# Patient Record
Sex: FEMALE | Race: WHITE | NOT HISPANIC OR LATINO | Employment: FULL TIME | URBAN - METROPOLITAN AREA
[De-identification: names, ages, dates, MRNs, and addresses within clinical notes are randomized per-mention and may not be internally consistent; named-entity substitution may affect disease eponyms.]

---

## 2017-12-18 ENCOUNTER — OFFICE VISIT (OUTPATIENT)
Dept: DERMATOLOGY | Facility: CLINIC | Age: 22
End: 2017-12-18
Payer: COMMERCIAL

## 2017-12-18 DIAGNOSIS — Z12.83 SKIN CANCER SCREENING: Primary | ICD-10-CM

## 2017-12-18 DIAGNOSIS — L82.1 SEBORRHEIC KERATOSIS: ICD-10-CM

## 2017-12-18 DIAGNOSIS — D22.9 MULTIPLE BENIGN NEVI: ICD-10-CM

## 2017-12-18 RX ORDER — NORETHINDRONE ACETATE AND ETHINYL ESTRADIOL 1MG-20(21)
KIT ORAL
Refills: 3 | COMMUNITY
Start: 2017-01-05 | End: 2024-06-19

## 2017-12-18 NOTE — LETTER
Date:December 19, 2017      Patient was self referred, no letter generated. Do not send.        [facility] Health Information

## 2017-12-18 NOTE — PROGRESS NOTES
"[facility] Dermatology Note    Dermatology Problem List:  1. Clinically benign nevi, SK on the right lateral abdomen  2. Skin cancer screening 12/18/17    CC:   Chief Complaint   Patient presents with     Skin Check     Skin check, Farhan states \" The moles on my stomach are being weird.\"     Date of Service: Dec 18, 2017    History of Present Illness:  Ms. Farhan Fowler is a 22 year old female who presents for a skin check as a new patient. Today the patient reports that she has moles of concern on her stomach and upper back. She states that the ones on her stomach started to change shape from [place] to oval so she was nervous. She also reports that one of the lesions scabbed up and then fell off. She notes that she has had the moles on her stomach since she can remember. She denies fevers, chills, chronic cough, or unexplained weightloss. She states that this was a very stressful semester for her. The patient reports no other lesions of concern at this time.    Otherwise, the patient reports no painful, bleeding, nonhealing, or pruritic lesions, and denies new or changing moles.    Past Medical History:   Patient Active Problem List   Diagnosis     NO ACTIVE PROBLEMS     CARDIOVASCULAR SCREENING; LDL GOAL LESS THAN 160     Past Medical History:   Diagnosis Date     NO ACTIVE PROBLEMS      No past surgical history on file.     Social History:  Patient has had minimal sun exposure, she burns very easily.  Used tanning beds once before her edis year prom.  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 Studied abroad in [place].  Moving to [place] after graduation, aspires to work in [employer]. Does not want to work in corporate.    Family History:  No known family history of skin cancer.    Medications:  Current Outpatient Prescriptions   Medication Sig Dispense Refill     medroxyPROGESTERone (DEPO-PROVERA) 150 MG/ML injection Inject 1 mL (150 mg) into the muscle every 3 " months (Patient not taking: Reported on 12/18/2017) 1 mL 4     Allergies:  Allergies   Allergen Reactions     Penicillins      unknown     Review of Systems:  - Skin: As above in HPI. No additional skin concerns.    Physical exam:  Vitals: There were no vitals taken for this visit.  GEN: This is a well developed, well-nourished female in no acute distress, in a pleasant mood.      SKIN: Full skin, which includes the head/face, both arms, chest, back, abdomen,both legs, genitalia and/or groin buttocks, digits and/or nails, was examined.  - Regular brown pigmented macules and papules are identified on the trunk, extremities, abdomen.   - Stuck on tan to brown papule on the right lateral abdomen  - No other lesions of concern on areas examined.     Impression/Plan:  1. Clinically benign nevi - trunk, extremities, abdomen  - No further intervention required. Patient to report changes.   - Sun precaution was advised including the use of sun screens of SPF 30 or higher, sun protective clothing, and avoidance of tanning beds.  - ABCDEs of melanoma were discussed and self skin checks were advised.     2. Seborrheic keratosis - right lateral abdomen  - No further intervention required. Patient to report changes.     Follow-up in 1 year, earlier for new or changing lesions.    Staff Involved:  Staff Only    Scribe Disclosure:   I, Leland Ceballos, am serving as a scribe to document services personally performed by Analilia Esquivel PA-C, based on data collection and the provider's statements to me.    Provider Disclosure:   The documentation recorded by the scribe accurately reflects the services I personally performed and the decisions made by me.    All risks, benefits and alternatives were discussed with patient.  Patient is in agreement and understands the assessment and plan.  All questions were answered.  Sun Screen Education was given.   Return to Clinic annually or sooner as needed.   Analilia Esquivel PA-C   [place]  [facility]

## 2017-12-18 NOTE — LETTER
"12/18/2017       RE: Farhan Fowler  [address] [address] [place] [id number]     Dear Colleague,    Thank you for referring your patient, Farhan Fowler, to the [facility]ERMATOLOG[facility] at [facility]. Please see a copy of my visit note below.    [facility] Dermatology Note    Dermatology Problem List:  1. Clinically benign nevi, SK on the right lateral abdomen  2. Skin cancer screening 12/18/17    CC:   Chief Complaint   Patient presents with     Skin Check     Skin check, Farhan states \" The moles on my stomach are being weird.\"     Date of Service: Dec 18, 2017    History of Present Illness:  Ms. Farhan Fowler is a 22 year old female who presents for a skin check as a new patient. Today the patient reports that she has moles of concern on her stomach and upper back. She states that the ones on her stomach started to change shape from [place] to oval so she was nervous. She also reports that one of the lesions scabbed up and then fell off. She notes that she has had the moles on her stomach since she can remember. She denies fevers, chills, chronic cough, or unexplained weightloss. She states that this was a very stressful semester for her. The patient reports no other lesions of concern at this time.    Otherwise, the patient reports no painful, bleeding, nonhealing, or pruritic lesions, and denies new or changing moles.    Past Medical History:   Patient Active Problem List   Diagnosis     NO ACTIVE PROBLEMS     CARDIOVASCULAR SCREENING; LDL GOAL LESS THAN 160     Past Medical History:   Diagnosis Date     NO ACTIVE PROBLEMS      No past surgical history on file.     Social History:  Patient has had minimal sun exposure, she burns very easily.  Used tanning beds once before her edis year prom.  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 Studied abroad in [place].  Moving to [place] after graduation, aspires to work in " animation. Does not want to work in [employer].    Family History:  No known family history of skin cancer.    Medications:  Current Outpatient Prescriptions   Medication Sig Dispense Refill     medroxyPROGESTERone (DEPO-PROVERA) 150 MG/ML injection Inject 1 mL (150 mg) into the muscle every 3 months (Patient not taking: Reported on 12/18/2017) 1 mL 4     Allergies:  Allergies   Allergen Reactions     Penicillins      unknown     Review of Systems:  - Skin: As above in HPI. No additional skin concerns.    Physical exam:  Vitals: There were no vitals taken for this visit.  GEN: This is a well developed, well-nourished female in no acute distress, in a pleasant mood.      SKIN: Full skin, which includes the head/face, both arms, chest, back, abdomen,both legs, genitalia and/or groin buttocks, digits and/or nails, was examined.  - Regular brown pigmented macules and papules are identified on the trunk, extremities, abdomen.   - Stuck on tan to brown papule on the right lateral abdomen  - No other lesions of concern on areas examined.     Impression/Plan:  1. Clinically benign nevi - trunk, extremities, abdomen  - No further intervention required. Patient to report changes.   - Sun precaution was advised including the use of sun screens of SPF 30 or higher, sun protective clothing, and avoidance of tanning beds.  - ABCDEs of melanoma were discussed and self skin checks were advised.     2. Seborrheic keratosis - right lateral abdomen  - No further intervention required. Patient to report changes.     Follow-up in 1 year, earlier for new or changing lesions.    Staff Involved:  Staff Only    Scribe Disclosure:   I, Leland Ceballos, am serving as a scribe to document services personally performed by Analilia Esquivel PA-C, based on data collection and the provider's statements to me.    Provider Disclosure:   The documentation recorded by the scribe accurately reflects the services I personally performed and the decisions  made by me.    All risks, benefits and alternatives were discussed with patient.  Patient is in agreement and understands the assessment and plan.  All questions were answered.  Sun Screen Education was given.   Return to Clinic annually or sooner as needed.   Analilia Esquivel PA-C   [facility]     Again, thank you for allowing me to participate in the care of your patient.      Sincerely,    Analilia Esquivel PA-C

## 2017-12-18 NOTE — NURSING NOTE
"Dermatology Rooming Note    Farhan Fowler's goals for this visit include:   Chief Complaint   Patient presents with     Skin Check     Skin check, Farhan states \" The moles on my stomach are being weird.\"     Helen Pacheco LPN  "

## 2017-12-18 NOTE — MR AVS SNAPSHOT
After Visit Summary   2017    Farhan Fowler    MRN: [id number]           Patient Information     Date Of Birth          1995        Visit Information        Provider Department      2017 5:30 PM Analilia Esquivel PA-C [facility] Dermatology        Today's Diagnoses     Skin cancer screening    -  1    Multiple benign nevi        Seborrheic keratosis           Follow-ups after your visit        Follow-up notes from your care team     Return in about 1 year (around 2018).      Who to contact     Please call your clinic at [phone] to:    Ask questions about your health    Make or cancel appointments    Discuss your medicines    Learn about your test results    Speak to your doctor   If you have compliments or concerns about an experience at your clinic, or if you wish to file a complaint, please contact [facility] Patient Relations at [phone] or email us at [email or web address]         Additional Information About Your Visit        MyChart Information     [employer] is an electronic gateway that provides easy, online access to your medical records. With [employer], you can request a clinic appointment, read your test results, renew a prescription or communicate with your care team.     To sign up for [employer] visit the website at [email or web address]   You will be asked to enter the access code listed below, as well as some personal information. Please follow the directions to create your username and password.     Your access code is: [email or web address]  Expires: 3/18/2018  7:06 PM     Your access code will  in 90 days. If you need help or a new code, please contact your [facility] or call [phone] for assistance.        Care EveryWhere ID     This is your Care EveryWhere ID. This could be used by other organizations to access your [facility] medical records  [id number]         Blood Pressure  from Last 3 Encounters:   07/10/15 110/60   11/28/14 100/62   08/28/13 120/82    Weight from Last 3 Encounters:   07/10/15 78.7 kg (173 lb 8 oz) (93 %)*   11/28/14 78 kg (172 lb) (93 %)*   08/28/13 78 kg (172 lb) (94 %)*     * Growth percentiles are based on [facility] 2-20 Years data.              Today, you had the following     No orders found for display         Today's Medication Changes          These changes are accurate as of: 12/18/17  7:06 PM.  If you have any questions, ask your nurse or doctor.               Stop taking these medicines if you haven't already. Please contact your care team if you have questions.     medroxyPROGESTERone 150 MG/ML injection   Commonly known as:  DEPO-PROVERA   Stopped by:  Analilia Esquivel PA-C                    Primary Care Provider    None Specified       No primary provider on file.        Equal Access to Services     MOY MORENO : Amol Mar, venus rainey, suzi wills, kelsey franklin . So [facility] [phone].    ATENCIÓN: Si habla español, tiene a walsh disposición servicios gratuitos de asistencia lingüística. Llame al [phone].    We comply with applicable federal civil rights laws and [place] laws. We do not discriminate on the basis of race, color, national origin, age, disability, sex, sexual orientation, or gender identity.            Thank you!     Thank you for choosing [facility] DERMATOLOGY  for your care. Our goal is always to provide you with excellent care. Hearing back from our patients is one way we can continue to improve our services. Please take a few minutes to complete the written survey that you may receive in the mail after your visit with us. Thank you!             Your Updated Medication List - Protect others around you: Learn how to safely use, store and throw away your medicines at www.disposemymeds.org.          This list is accurate as of: 12/18/17  7:06 PM.  Always use your most  recent med list.                   Brand Name Dispense Instructions for use Diagnosis    BLISOVI FE 1/20 1-20 MG-MCG per tablet   Generic drug:  norethindrone-ethinyl estradiol      TAKE 1 TABLET BY MOUTH DAILY.

## 2020-07-24 ENCOUNTER — TELEPHONE (OUTPATIENT)
Dept: SCHEDULING | Age: 25
End: 2020-07-24

## 2020-07-25 ENCOUNTER — LAB SERVICES (OUTPATIENT)
Dept: LAB | Age: 25
End: 2020-07-25

## 2020-07-25 DIAGNOSIS — Z20.822 SUSPECTED COVID-19 VIRUS INFECTION: Primary | ICD-10-CM

## 2020-07-25 PROCEDURE — 87635 SARS-COV-2 COVID-19 AMP PRB: CPT | Performed by: FAMILY MEDICINE

## 2020-07-31 LAB
SARS-COV-2 RNA RESP QL NAA+PROBE: NOT DETECTED
SPECIMEN SOURCE: NORMAL

## 2023-05-12 ENCOUNTER — TRANSFERRED RECORDS (OUTPATIENT)
Dept: HEALTH INFORMATION MANAGEMENT | Facility: CLINIC | Age: 28
End: 2023-05-12

## 2023-05-17 ENCOUNTER — TRANSFERRED RECORDS (OUTPATIENT)
Dept: HEALTH INFORMATION MANAGEMENT | Facility: CLINIC | Age: 28
End: 2023-05-17

## 2023-10-02 ENCOUNTER — TRANSFERRED RECORDS (OUTPATIENT)
Dept: OBGYN | Facility: CLINIC | Age: 28
End: 2023-10-02

## 2024-06-14 ENCOUNTER — TELEPHONE (OUTPATIENT)
Dept: OBGYN | Facility: CLINIC | Age: 29
End: 2024-06-14
Payer: COMMERCIAL

## 2024-06-14 NOTE — TELEPHONE ENCOUNTER
[facility] Call Center    Phone Message    May a detailed message be left on voicemail: yes     Reason for Call: Other: Pt was calling in regards to requesting records for an upcoming appointment with NAYE Dunn. Pt states that she requested medical records to be faxed over. The person the patient spoke to from there, was told to reach out to provider for upcoming appointment to get an urgent request of records sent over. Records would come from [facility], in [place]. Their fax is [phone]. Please call pt with any questions or concerns.      Action Taken: Other: OBGYN    Travel Screening: Not Applicable     Date of Service:

## 2024-06-17 NOTE — TELEPHONE ENCOUNTER
[facility]  Reach out to HIM, number provided.  Carlos Eduardo Briseno RN on 6/17/2024 at 10:40 AM

## 2024-06-19 ENCOUNTER — OFFICE VISIT (OUTPATIENT)
Dept: OBGYN | Facility: CLINIC | Age: 29
End: 2024-06-19
Payer: COMMERCIAL

## 2024-06-19 VITALS — DIASTOLIC BLOOD PRESSURE: 68 MMHG | SYSTOLIC BLOOD PRESSURE: 114 MMHG | WEIGHT: 231 LBS | BODY MASS INDEX: 36.73 KG/M2

## 2024-06-19 DIAGNOSIS — Z13.29 SCREENING FOR THYROID DISORDER: ICD-10-CM

## 2024-06-19 DIAGNOSIS — N92.6 IRREGULAR MENSES: ICD-10-CM

## 2024-06-19 DIAGNOSIS — E28.2 PCOS (POLYCYSTIC OVARIAN SYNDROME): Primary | ICD-10-CM

## 2024-06-19 DIAGNOSIS — E66.09 CLASS 2 OBESITY DUE TO EXCESS CALORIES WITH BODY MASS INDEX (BMI) OF 36.0 TO 36.9 IN ADULT, UNSPECIFIED WHETHER SERIOUS COMORBIDITY PRESENT: ICD-10-CM

## 2024-06-19 DIAGNOSIS — Z13.228 SCREENING FOR METABOLIC DISORDER: ICD-10-CM

## 2024-06-19 DIAGNOSIS — E66.812 CLASS 2 OBESITY DUE TO EXCESS CALORIES WITH BODY MASS INDEX (BMI) OF 36.0 TO 36.9 IN ADULT, UNSPECIFIED WHETHER SERIOUS COMORBIDITY PRESENT: ICD-10-CM

## 2024-06-19 LAB — HBA1C MFR BLD: 5 % (ref 0–5.6)

## 2024-06-19 PROCEDURE — 99204 OFFICE O/P NEW MOD 45 MIN: CPT | Performed by: NURSE PRACTITIONER

## 2024-06-19 PROCEDURE — 84443 ASSAY THYROID STIM HORMONE: CPT | Performed by: NURSE PRACTITIONER

## 2024-06-19 PROCEDURE — 86800 THYROGLOBULIN ANTIBODY: CPT | Performed by: NURSE PRACTITIONER

## 2024-06-19 PROCEDURE — 84403 ASSAY OF TOTAL TESTOSTERONE: CPT | Performed by: NURSE PRACTITIONER

## 2024-06-19 PROCEDURE — 82626 DEHYDROEPIANDROSTERONE: CPT | Mod: 90 | Performed by: NURSE PRACTITIONER

## 2024-06-19 PROCEDURE — 86376 MICROSOMAL ANTIBODY EACH: CPT | Performed by: NURSE PRACTITIONER

## 2024-06-19 PROCEDURE — 84270 ASSAY OF SEX HORMONE GLOBUL: CPT | Performed by: NURSE PRACTITIONER

## 2024-06-19 PROCEDURE — 99000 SPECIMEN HANDLING OFFICE-LAB: CPT | Performed by: NURSE PRACTITIONER

## 2024-06-19 PROCEDURE — 36415 COLL VENOUS BLD VENIPUNCTURE: CPT | Performed by: NURSE PRACTITIONER

## 2024-06-19 PROCEDURE — 82166 ASSAY ANTI-MULLERIAN HORM: CPT | Performed by: NURSE PRACTITIONER

## 2024-06-19 PROCEDURE — 82157 ASSAY OF ANDROSTENEDIONE: CPT | Mod: 90 | Performed by: NURSE PRACTITIONER

## 2024-06-19 PROCEDURE — 83001 ASSAY OF GONADOTROPIN (FSH): CPT | Performed by: NURSE PRACTITIONER

## 2024-06-19 PROCEDURE — 83036 HEMOGLOBIN GLYCOSYLATED A1C: CPT | Performed by: NURSE PRACTITIONER

## 2024-06-19 RX ORDER — COPPER 313.4 MG/1
1 INTRAUTERINE DEVICE INTRAUTERINE ONCE
COMMUNITY
Start: 2019-01-01 | End: 2029-01-01

## 2024-06-19 RX ORDER — ALPRAZOLAM 0.25 MG
0.25 TABLET ORAL PRN
COMMUNITY
Start: 2024-03-22 | End: 2025-03-23

## 2024-06-19 RX ORDER — SEMAGLUTIDE 2.4 MG/.75ML
INJECTION, SOLUTION SUBCUTANEOUS
COMMUNITY
End: 2024-07-01

## 2024-06-19 RX ORDER — MULTIVITAMIN WITH IRON
1 TABLET ORAL DAILY
COMMUNITY

## 2024-06-19 ASSESSMENT — ANXIETY QUESTIONNAIRES
1. FEELING NERVOUS, ANXIOUS, OR ON EDGE: MORE THAN HALF THE DAYS
2. NOT BEING ABLE TO STOP OR CONTROL WORRYING: SEVERAL DAYS
6. BECOMING EASILY ANNOYED OR IRRITABLE: MORE THAN HALF THE DAYS
GAD7 TOTAL SCORE: 10
IF YOU CHECKED OFF ANY PROBLEMS ON THIS QUESTIONNAIRE, HOW DIFFICULT HAVE THESE PROBLEMS MADE IT FOR YOU TO DO YOUR WORK, TAKE CARE OF THINGS AT HOME, OR GET ALONG WITH OTHER PEOPLE: SOMEWHAT DIFFICULT
4. TROUBLE RELAXING: MORE THAN HALF THE DAYS
GAD7 TOTAL SCORE: 10
5. BEING SO RESTLESS THAT IT IS HARD TO SIT STILL: NOT AT ALL
7. FEELING AFRAID AS IF SOMETHING AWFUL MIGHT HAPPEN: SEVERAL DAYS
8. IF YOU CHECKED OFF ANY PROBLEMS, HOW DIFFICULT HAVE THESE MADE IT FOR YOU TO DO YOUR WORK, TAKE CARE OF THINGS AT HOME, OR GET ALONG WITH OTHER PEOPLE?: SOMEWHAT DIFFICULT
GAD7 TOTAL SCORE: 10
3. WORRYING TOO MUCH ABOUT DIFFERENT THINGS: MORE THAN HALF THE DAYS
7. FEELING AFRAID AS IF SOMETHING AWFUL MIGHT HAPPEN: SEVERAL DAYS

## 2024-06-19 ASSESSMENT — PATIENT HEALTH QUESTIONNAIRE - PHQ9
SUM OF ALL RESPONSES TO PHQ QUESTIONS 1-9: 12
10. IF YOU CHECKED OFF ANY PROBLEMS, HOW DIFFICULT HAVE THESE PROBLEMS MADE IT FOR YOU TO DO YOUR WORK, TAKE CARE OF THINGS AT HOME, OR GET ALONG WITH OTHER PEOPLE: SOMEWHAT DIFFICULT
SUM OF ALL RESPONSES TO PHQ QUESTIONS 1-9: 12

## 2024-06-19 NOTE — PROGRESS NOTES
SUBJECTIVE:                                                   Farhan Fowler is a 29 year old female who presents to clinic today for the following health issue(s):  Patient presents with:  Consult: Had a general and endocrine in [place] has tried paleo and now taking wegovy for 6 months and has had good success       HPI:  New patient to me here today to establish care.  She was referred to us by another patient of mine.    She had lived in [place] for a while but worked for the [employer] and had been living in [place] for a number of years.  She is looking to establish care team since moving back to [place].    She has multiple concerns for us today includin: establishing with a new primary care.  She lives in the [facility] area.  2: PCOS: She was diagnosed 2 years ago and has a blood work and ultrasound to support this diagnosis.  She states that she had normal menstrual cycles up until 2 years ago.  She has more regular cycles when she is focused on getting her BMI in the normal range.  3: Possible referral to endocrinology.  She had some blood work done in February while in [place] that might have been suggestive of possible Hashimoto's.  Her father has a history of Hashimoto's.  4: She is currently using Wegovy for both PCOS, prediabetes and obesity management.  She has been doing very well on this.  Her prior authorization is due at the end of the month and she would like to continue therapy.  5: She does struggle with both anxiety and depressive episodes.  She is not currently using any medication.  She has Xanax prescribed but does not use it.  She has been seeing a therapist but since moving back to [place] she would like to reestablish and is open to a referral today.    She is due for her annual GYN exam.    She has a ParaGard IUD in place.  [facility] was [facility] in 2019 at [facility].    Patient's last menstrual period was 2024..     Patient is sexually active, No obstetric  history on file..  Using IUD for contraception.    reports that she has never smoked. She has never used smokeless tobacco.    STD testing offered?  Accepted    Health maintenance updated:  yes    Today's PHQ-2 Score:       6/19/2024     2:13 PM   PHQ-2 ( 1999 Pfizer)   Q1: Little interest or pleasure in doing things 2   Q2: Feeling down, depressed or hopeless 1   PHQ-2 Score 3   Q1: Little interest or pleasure in doing things More than half the days   Q2: Feeling down, depressed or hopeless Several days   PHQ-2 Score 3     Today's PHQ-9 Score:       6/19/2024     2:12 PM   PHQ-9 SCORE   PHQ-9 Total Score MyChart 12 (Moderate depression)   PHQ-9 Total Score 12     Today's ISAAC-7 Score:       6/19/2024     2:13 PM   ISAAC-7 SCORE   Total Score 10 (moderate anxiety)   Total Score 10       Problem list and histories reviewed & adjusted, as indicated.  Additional history: as documented.    Patient Active Problem List   Diagnosis     NO ACTIVE PROBLEMS     CARDIOVASCULAR SCREENING; LDL GOAL LESS THAN 160     History reviewed. No pertinent surgical history.   Social History     Tobacco Use     Smoking status: Never     Smokeless tobacco: Never   Substance Use Topics     Alcohol use: Yes      Problem (# of Occurrences) Relation (Name,Age of Onset)    Breast Cancer (2) Maternal Grandmother, Maternal Aunt    ALS (1) Maternal Grandmother    Hashimoto's thyroiditis (1) Father           Negative family history of: Melanoma, Skin Cancer              Current Outpatient Medications   Medication Sig Dispense Refill     magnesium 250 MG tablet Take 1 tablet by mouth daily       paragard intrauterine copper device 1 each by Intrauterine route once       WEGOVY 2.4 MG/0.75ML pen INJECT 2.4MG (1 PEN) UNDER THE SKIN ONCE A WEEK       ALPRAZolam (XANAX) 0.25 MG tablet Take 0.25 mg by mouth (Patient not taking: Reported on 6/19/2024)       No current facility-administered medications for this visit.     Allergies   Allergen Reactions      Shellfish Allergy Other (See Comments)     Nuts Itching     Other (Do Not Use) Hives     lavander     Pcn [Penicillins]      unknown       ROS:  12 point review of systems negative other than symptoms noted below or in the HPI.  Constitutional: Weight Gain  Genitourinary: Irregular Menses  Endocrine: abnormal thyroid level  Psychiatric: Anxiety and Depression  No urinary frequency or dysuria, bladder or kidney problems, POSITIVE for:, irregular menses      OBJECTIVE:     /68   Wt 104.8 kg (231 lb)   LMP 05/31/2024   BMI 36.73 kg/m    Body mass index is 36.73 kg/m .    Exam:  Constitutional:  Appearance: Well nourished, well developed alert, in no acute distress  Chest:  Respiratory Effort:  Breathing unlabored.   Neurologic:  Mental Status:  Oriented X3.  Normal strength and tone, sensory exam grossly normal, mentation intact and speech normal.    Psychiatric:  Mentation appears normal and affect normal/bright.     In-Clinic Test Results:  Results for orders placed or performed in visit on 06/19/24 (from the past 24 hour(s))   Testosterone Free and Total    Narrative    The following orders were created for panel order Testosterone Free and Total.  Procedure                               Abnormality         Status                     ---------                               -----------         ------                     Sex Hormone Binding Glob...[[id number]]                      In process                 Testosterone Free and Total[[id number]]                      In process                   Please view results for these tests on the individual orders.       ASSESSMENT/PLAN:                                                        ICD-10-CM    1. PCOS (polycystic ovarian syndrome)  E28.2 Follicle stimulating hormone     Mullerian Hormone Antibody     Androstenedione     Dehydroepiandrosterone     Testosterone Free and Total     Follicle stimulating hormone     Mullerian Hormone Antibody     Androstenedione      Dehydroepiandrosterone     Testosterone Free and Total     US Transvaginal Pelvic Non-OB      2. Screening for thyroid disorder  Z13.29 TSH with free T4 reflex     Anti thyroglobulin antibody     Thyroid peroxidase antibody     TSH with free T4 reflex     Anti thyroglobulin antibody     Thyroid peroxidase antibody      3. Irregular menses  N92.6 Follicle stimulating hormone     Mullerian Hormone Antibody     Androstenedione     Dehydroepiandrosterone     Testosterone Free and Total     Follicle stimulating hormone     Mullerian Hormone Antibody     Androstenedione     Dehydroepiandrosterone     Testosterone Free and Total     US Transvaginal Pelvic Non-OB      4. Screening for metabolic disorder  Z13.228 Hemoglobin A1c     Hemoglobin A1c      5. Class 2 obesity due to excess calories with body mass index (BMI) of 36.0 to 36.9 in adult, unspecified whether serious comorbidity present  E66.09 Hemoglobin A1c    Z68.36 Hemoglobin A1c          There are no Patient Instructions on file for this visit.    29-year-old female looking to establish care.  We have given written referral for primary care providers in the area.  We will do some blood work today and have invited her back for her annual GYN exam and a transvaginal ultrasound.  We have given her written referral for therapist in the area.  We will revisit the PCOS and thyroid management after her blood work returns.  In terms of her semaglutide injections we have asked her to discuss further with primary care.    (45 minutes was spent on the date of the encounter doing chart review, review of outside records, review and interpretation of pertinent test results, history and exam, documentation, patient counseling, and further activities as noted above.)     NAYE Damon [facility] FOR [facility]    Answers submitted by the patient for this visit:  Patient Health Questionnaire (Submitted on 6/19/2024)  If you checked off any problems,  how difficult have these problems made it for you to do your work, take care of things at home, or get along with other people?: Somewhat difficult  PHQ9 TOTAL SCORE: 12  ISAAC-7 (Submitted on 6/19/2024)  ISAAC 7 TOTAL SCORE: 10  General Questionnaire (Submitted on 6/19/2024)  Chief Complaint: Chronic problems general questions HPI Form  What is the reason for your visit today? : pcos/hashimotos managenent. Establ[place] new care[place]  How many servings of fruits and vegetables do you eat daily?: 2-3  On average, how many sweetened beverages do you drink each day (Examples: soda, juice, sweet tea, etc.  Do NOT count diet or artificially sweetened beverages)?: 0  How many minutes a day do you exercise enough to make your heart beat faster?: 10 to 19  How many days a week do you exercise enough to make your heart beat faster?: 5  How many days per week do you miss taking your medication?: 0

## 2024-06-20 LAB
FSH SERPL IRP2-ACNC: 3.4 MIU/ML
MIS SERPL-MCNC: 6.22 NG/ML (ref 0.89–9.9)
SHBG SERPL-SCNC: 27 NMOL/L (ref 30–135)
TSH SERPL DL<=0.005 MIU/L-ACNC: 1.62 UIU/ML (ref 0.3–4.2)

## 2024-06-21 LAB
TESTOST FREE SERPL-MCNC: 0.72 NG/DL
TESTOST SERPL-MCNC: 36 NG/DL (ref 8–60)
THYROGLOB AB SERPL IA-ACNC: <20 IU/ML
THYROPEROXIDASE AB SERPL-ACNC: 48 IU/ML

## 2024-06-25 LAB
ANDROST SERPL-MCNC: 1.57 NG/ML
DHEA SERPL-MCNC: 2.85 NG/ML

## 2024-07-01 ENCOUNTER — OFFICE VISIT (OUTPATIENT)
Dept: FAMILY MEDICINE | Facility: CLINIC | Age: 29
End: 2024-07-01

## 2024-07-01 VITALS
HEART RATE: 72 BPM | HEIGHT: 67 IN | BODY MASS INDEX: 35.25 KG/M2 | WEIGHT: 224.6 LBS | SYSTOLIC BLOOD PRESSURE: 132 MMHG | DIASTOLIC BLOOD PRESSURE: 76 MMHG | OXYGEN SATURATION: 100 %

## 2024-07-01 DIAGNOSIS — R76.8 ANTI-TPO ANTIBODIES PRESENT: Primary | ICD-10-CM

## 2024-07-01 DIAGNOSIS — E66.812 CLASS 2 OBESITY DUE TO EXCESS CALORIES WITHOUT SERIOUS COMORBIDITY WITH BODY MASS INDEX (BMI) OF 35.0 TO 35.9 IN ADULT: ICD-10-CM

## 2024-07-01 DIAGNOSIS — E66.09 CLASS 2 OBESITY DUE TO EXCESS CALORIES WITHOUT SERIOUS COMORBIDITY WITH BODY MASS INDEX (BMI) OF 35.0 TO 35.9 IN ADULT: ICD-10-CM

## 2024-07-01 DIAGNOSIS — E28.2 PCOS (POLYCYSTIC OVARIAN SYNDROME): ICD-10-CM

## 2024-07-01 DIAGNOSIS — F41.9 ANXIETY: ICD-10-CM

## 2024-07-01 PROCEDURE — 99204 OFFICE O/P NEW MOD 45 MIN: CPT | Performed by: FAMILY MEDICINE

## 2024-07-01 PROCEDURE — G2211 COMPLEX E/M VISIT ADD ON: HCPCS | Performed by: FAMILY MEDICINE

## 2024-07-01 RX ORDER — IBUPROFEN 800 MG/1
800 TABLET, FILM COATED ORAL EVERY 8 HOURS PRN
COMMUNITY
Start: 2024-05-15

## 2024-07-01 RX ORDER — SEMAGLUTIDE 2.4 MG/.75ML
2.4 INJECTION, SOLUTION SUBCUTANEOUS WEEKLY
Qty: 9 ML | Refills: 1 | Status: SHIPPED | OUTPATIENT
Start: 2024-07-01 | End: 2024-07-22

## 2024-07-01 NOTE — PROGRESS NOTES
"  Pavel Real is a 29 year old patient who presents to clinic to establish care.  She was referred by Bernie Dunn at [facility].      PCOS: diagnosed by endocrinology in [place].  She was started on Wegovy to help with weight and metabolic dysfunction and insuline resistance.  It has been quite effective.  She is taking 2.4 mg weekly and tolerating very well.  She needs a refill.  She is concerned about a possible change in coverage when her insurance switches.    Obesity with BMI 35: has had good results on Wegovy.  Has improved her PCOS symptoms including irregular menses and hirsutism/hyperandrogenism, as well as weight.    Positive TPO Ab: historical diagnosis of chronic lymphocytic thyroiditis.  Patient is unsure of how the diagnosis was made but she previously saw endocrinology.  Recent TSH normal and has no symptoms.    Review of Systems   Constitutional, HEENT, cardiovascular, pulmonary, GI, , musculoskeletal, neuro, skin, endocrine and psych systems are negative, except as otherwise noted.      Objective    /76   Pulse 72   Ht 1.695 m (5' 6.75\")   Wt 101.9 kg (224 lb 9.6 oz)   LMP 05/31/2024   SpO2 100%   BMI 35.44 kg/m      General: Well appearing, NAD  Psych: normal mood and affect        No results found for this or any previous visit (from the past 24 hour(s)).    Anti-TPO antibodies present  No further work up at this time.  TSH recently normal.    Class 2 obesity due to excess calories without serious comorbidity with body mass index (BMI) of 35.0 to 35.9 in adult  Cont Wegovy, excellent results so far.  If insurance coverage changes will need to consider alternatives  - WEGOVY 2.4 MG/0.75ML pen; Inject 2.4 mg Subcutaneous once a week    PCOS (polycystic ovarian syndrome)  Cont Wegovy and efforts to reduce weight  - WEGOVY 2.4 MG/0.75ML pen; Inject 2.4 mg Subcutaneous once a week    Anxiety  Uses xanax sparingly.  Discussed selective serotonin reuptake inhibitor and " other options.  Will start therapy and consider    Follow up in 2-3 months, sooner if needed.

## 2024-07-03 ENCOUNTER — MYC MEDICAL ADVICE (OUTPATIENT)
Dept: FAMILY MEDICINE | Facility: CLINIC | Age: 29
End: 2024-07-03

## 2024-07-03 NOTE — TELEPHONE ENCOUNTER
CARDIOLOGIST : DR. Ronak Weston. Wegovy prior authorization request submitted via [employer] and was approved. Patient and pharmacy informed.

## 2024-07-22 ENCOUNTER — MYC REFILL (OUTPATIENT)
Dept: FAMILY MEDICINE | Facility: CLINIC | Age: 29
End: 2024-07-22

## 2024-07-22 DIAGNOSIS — E66.09 CLASS 2 OBESITY DUE TO EXCESS CALORIES WITHOUT SERIOUS COMORBIDITY WITH BODY MASS INDEX (BMI) OF 35.0 TO 35.9 IN ADULT: ICD-10-CM

## 2024-07-22 DIAGNOSIS — E66.812 CLASS 2 OBESITY DUE TO EXCESS CALORIES WITHOUT SERIOUS COMORBIDITY WITH BODY MASS INDEX (BMI) OF 35.0 TO 35.9 IN ADULT: ICD-10-CM

## 2024-07-22 DIAGNOSIS — E28.2 PCOS (POLYCYSTIC OVARIAN SYNDROME): ICD-10-CM

## 2024-07-22 RX ORDER — SEMAGLUTIDE 2.4 MG/.75ML
2.4 INJECTION, SOLUTION SUBCUTANEOUS WEEKLY
Qty: 9 ML | Refills: 1 | Status: SHIPPED | OUTPATIENT
Start: 2024-07-22

## 2024-07-22 NOTE — TELEPHONE ENCOUNTER
Med: Wegovy    LOV (related): 7/1/24      Due for F/U around: 9/1/24-10/1/24    Next Appt: None        Wt Readings from Last 2 Encounters:   07/01/24 101.9 kg (224 lb 9.6 oz)   06/19/24 104.8 kg (231 lb)       BMI Readings from Last 2 Encounters:   07/01/24 35.44 kg/m    06/19/24 36.73 kg/m

## 2024-08-10 ENCOUNTER — HEALTH MAINTENANCE LETTER (OUTPATIENT)
Age: 29
End: 2024-08-10

## 2024-08-14 ENCOUNTER — OFFICE VISIT (OUTPATIENT)
Dept: FAMILY MEDICINE | Facility: CLINIC | Age: 29
End: 2024-08-14

## 2024-08-14 ENCOUNTER — MYC REFILL (OUTPATIENT)
Dept: FAMILY MEDICINE | Facility: CLINIC | Age: 29
End: 2024-08-14

## 2024-08-14 VITALS
SYSTOLIC BLOOD PRESSURE: 125 MMHG | OXYGEN SATURATION: 98 % | HEART RATE: 71 BPM | DIASTOLIC BLOOD PRESSURE: 80 MMHG | BODY MASS INDEX: 34.46 KG/M2 | WEIGHT: 218.4 LBS

## 2024-08-14 DIAGNOSIS — E28.2 PCOS (POLYCYSTIC OVARIAN SYNDROME): Primary | ICD-10-CM

## 2024-08-14 DIAGNOSIS — E28.2 PCOS (POLYCYSTIC OVARIAN SYNDROME): ICD-10-CM

## 2024-08-14 DIAGNOSIS — E66.09 CLASS 2 OBESITY DUE TO EXCESS CALORIES WITHOUT SERIOUS COMORBIDITY WITH BODY MASS INDEX (BMI) OF 35.0 TO 35.9 IN ADULT: ICD-10-CM

## 2024-08-14 DIAGNOSIS — E66.812 CLASS 2 OBESITY DUE TO EXCESS CALORIES WITHOUT SERIOUS COMORBIDITY WITH BODY MASS INDEX (BMI) OF 35.0 TO 35.9 IN ADULT: ICD-10-CM

## 2024-08-14 PROCEDURE — 99213 OFFICE O/P EST LOW 20 MIN: CPT | Performed by: FAMILY MEDICINE

## 2024-08-14 PROCEDURE — G2211 COMPLEX E/M VISIT ADD ON: HCPCS | Performed by: FAMILY MEDICINE

## 2024-08-14 RX ORDER — SEMAGLUTIDE 2.4 MG/.75ML
2.4 INJECTION, SOLUTION SUBCUTANEOUS WEEKLY
Qty: 9 ML | Refills: 1 | Status: CANCELLED | OUTPATIENT
Start: 2024-08-14

## 2024-08-14 NOTE — PATIENT INSTRUCTIONS
[facility]    Address: [address], [place] [address]  Hours:   Closes soon ? 4:30?PM ? Opens 8?AM Thu  Phone: [phone]

## 2024-08-14 NOTE — PROGRESS NOTES
Subjective     Farhan is a 29 year old patient who presents to clinic for follow up.      PCOS: on wegovy.  Feels her new insurance may not cover this.  Would like endocrinology referral as it was advised to her this might help with approval process.      Obesity with BMI 35: has had good results on Wegovy.  Has improved her PCOS symptoms including irregular menses and hirsutism/hyperandrogenism, as well as weight.     Positive TPO Ab: historical diagnosis of chronic lymphocytic thyroiditis.  Patient is unsure of how the diagnosis was made but she previously saw endocrinology.  Recent TSH normal and has no symptoms.           Review of Systems   Constitutional, HEENT, cardiovascular, pulmonary, GI, , musculoskeletal, neuro, skin, endocrine and psych systems are negative, except as otherwise noted.      Objective    /80   Pulse 71   Wt 99.1 kg (218 lb 6.4 oz)   LMP 05/31/2024   SpO2 98%   BMI 34.46 kg/m      General: Well appearing, NAD  Psych: normal mood and affect        No results found for this or any previous visit (from the past 24 hour(s)).    PCOS (polycystic ovarian syndrome)  Referral placed.  Did discuss with patient that this is unlikely to lead to insurance approval.  - Adult Endocrinology  Referral - To a [facility] Location (Use POS/Location); Future    Class 2 obesity due to excess calories without serious comorbidity with body mass index (BMI) of 35.0 to 35.9 in adult  - Discussed importance of optimizing diet, exercise and healthy weight  - Adult Endocrinology  Referral - To a [facility] Location (Use POS/Location); Future

## 2024-08-19 ENCOUNTER — MYC MEDICAL ADVICE (OUTPATIENT)
Dept: FAMILY MEDICINE | Facility: CLINIC | Age: 29
End: 2024-08-19

## 2024-08-21 ENCOUNTER — TRANSFERRED RECORDS (OUTPATIENT)
Dept: FAMILY MEDICINE | Facility: CLINIC | Age: 29
End: 2024-08-21

## 2024-08-26 ENCOUNTER — TRANSFERRED RECORDS (OUTPATIENT)
Dept: FAMILY MEDICINE | Facility: CLINIC | Age: 29
End: 2024-08-26

## 2024-09-03 PROBLEM — E06.3 CHRONIC LYMPHOCYTIC THYROIDITIS: Status: ACTIVE | Noted: 2023-11-06

## 2024-09-03 NOTE — PROGRESS NOTES
Farhan Fowler is a 29 year old female who is being evaluated via a patient initiated billable telephone visit.     What phone number would you like to be contacted at? ***  How would you like to obtain your AVS? {AVS Preference:443838}      Originating Location (pt. Location): ***      Distant Location (provider location):  On-site      SUBJECTIVE:                                                   Farhan Fowler is a 29 year old female who presents for virtual visit today for the following health issue(s):  No chief complaint on file.      Additional information: ***    HPI:  ***    No LMP recorded. (Menstrual status: IUD)..     Patient {is/is not:309522} sexually active, No obstetric history on file..  Using {PLC CONTRACEPTION:239311} for contraception.    reports that she has never smoked. She has never used smokeless tobacco.  {Tobacco Cessation -- Delete if patient is a non-smoker:167653}    Health maintenance updated:  no    Today's PHQ-2 Score:       6/19/2024     2:13 PM   PHQ-2 ( 1999 [employer])   Q1: Little interest or pleasure in doing things 2   Q2: Feeling down, depressed or hopeless 1   PHQ-2 Score 3   Q1: Little interest or pleasure in doing things More than half the days   Q2: Feeling down, depressed or hopeless Several days   PHQ-2 Score 3     Today's PHQ-9 Score:       6/19/2024     2:12 PM   PHQ-9 SCORE   PHQ-9 Total Score MyChart 12 (Moderate depression)   PHQ-9 Total Score 12     Today's ISAAC-7 Score:       6/19/2024     2:13 PM   ISAAC-7 SCORE   Total Score 10 (moderate anxiety)   Total Score 10       Problem list and histories reviewed & adjusted, as indicated.  Additional history: as documented.    Patient Active Problem List   Diagnosis    NO ACTIVE PROBLEMS    CARDIOVASCULAR SCREENING; LDL GOAL LESS THAN 160    Anti-TPO antibodies present    PCOS (polycystic ovarian syndrome)    Class 2 obesity due to excess calories without serious comorbidity with body mass index (BMI) of 35.0 to 35.9 in  adult    Anxiety     No past surgical history on file.   Social History     Tobacco Use    Smoking status: Never    Smokeless tobacco: Never   Substance Use Topics    Alcohol use: Yes      Problem (# of Occurrences) Relation (Name,Age of Onset)    Breast Cancer (2) Maternal Grandmother, Maternal Aunt    ALS (1) Maternal Grandmother    Hashimoto's thyroiditis (1) Father           Negative family history of: Melanoma, Skin Cancer              Current Outpatient Medications   Medication Sig Dispense Refill    ALPRAZolam (XANAX) 0.25 MG tablet Take 0.25 mg by mouth as needed      ibuprofen (ADVIL/MOTRIN) 800 MG tablet Take 800 mg by mouth every 8 hours as needed for mild pain or moderate pain      magnesium 250 MG tablet Take 1 tablet by mouth daily      paragard intrauterine copper device 1 each by Intrauterine route once      WEGOVY 2.4 MG/0.75ML pen Inject 2.4 mg subcutaneously once a week 9 mL 1     No current facility-administered medications for this visit.     Allergies   Allergen Reactions    Shellfish Allergy Other (See Comments)    Nuts Itching     ALMONDS    Other (Do Not Use) Hives     lavander    Penicillins Hives     As a child  Unknown         OBJECTIVE:     No vitals were obtained today due to virtual telephone visit.    Physical Exam  {video visit exam brief selected:352501}          ASSESSMENT/PLAN:                                                      Phone call duration: *** minutes    No diagnosis found.    There are no Patient Instructions on file for this visit.    ***    NAYE Damon CNP  [facility]

## 2024-09-05 ENCOUNTER — ANCILLARY PROCEDURE (OUTPATIENT)
Dept: ULTRASOUND IMAGING | Facility: CLINIC | Age: 29
End: 2024-09-05
Attending: NURSE PRACTITIONER
Payer: COMMERCIAL

## 2024-09-05 ENCOUNTER — OFFICE VISIT (OUTPATIENT)
Dept: OBGYN | Facility: CLINIC | Age: 29
End: 2024-09-05
Attending: NURSE PRACTITIONER
Payer: COMMERCIAL

## 2024-09-05 VITALS
SYSTOLIC BLOOD PRESSURE: 110 MMHG | WEIGHT: 213.6 LBS | DIASTOLIC BLOOD PRESSURE: 70 MMHG | BODY MASS INDEX: 33.53 KG/M2 | HEIGHT: 67 IN

## 2024-09-05 DIAGNOSIS — Z01.419 ENCOUNTER FOR GYNECOLOGICAL EXAMINATION WITHOUT ABNORMAL FINDING: Primary | ICD-10-CM

## 2024-09-05 DIAGNOSIS — E28.2 PCOS (POLYCYSTIC OVARIAN SYNDROME): ICD-10-CM

## 2024-09-05 DIAGNOSIS — Z11.3 SCREENING EXAMINATION FOR STD (SEXUALLY TRANSMITTED DISEASE): ICD-10-CM

## 2024-09-05 DIAGNOSIS — Z01.84 IMMUNITY STATUS TESTING: ICD-10-CM

## 2024-09-05 DIAGNOSIS — Z11.59 ENCOUNTER FOR HEPATITIS C SCREENING TEST FOR LOW RISK PATIENT: ICD-10-CM

## 2024-09-05 DIAGNOSIS — Z11.4 SCREENING FOR HIV (HUMAN IMMUNODEFICIENCY VIRUS): ICD-10-CM

## 2024-09-05 DIAGNOSIS — K62.9 PERIANAL LESION: ICD-10-CM

## 2024-09-05 DIAGNOSIS — Z11.8 SCREENING FOR CHLAMYDIAL DISEASE: ICD-10-CM

## 2024-09-05 DIAGNOSIS — N92.6 IRREGULAR MENSES: ICD-10-CM

## 2024-09-05 LAB — T PALLIDUM AB SER QL: NONREACTIVE

## 2024-09-05 PROCEDURE — 99459 PELVIC EXAMINATION: CPT | Performed by: NURSE PRACTITIONER

## 2024-09-05 PROCEDURE — G0145 SCR C/V CYTO,THINLAYER,RESCR: HCPCS | Performed by: NURSE PRACTITIONER

## 2024-09-05 PROCEDURE — 76830 TRANSVAGINAL US NON-OB: CPT | Performed by: STUDENT IN AN ORGANIZED HEALTH CARE EDUCATION/TRAINING PROGRAM

## 2024-09-05 PROCEDURE — 87491 CHLMYD TRACH DNA AMP PROBE: CPT | Performed by: NURSE PRACTITIONER

## 2024-09-05 PROCEDURE — 36415 COLL VENOUS BLD VENIPUNCTURE: CPT | Performed by: NURSE PRACTITIONER

## 2024-09-05 PROCEDURE — 86706 HEP B SURFACE ANTIBODY: CPT | Performed by: NURSE PRACTITIONER

## 2024-09-05 PROCEDURE — 99395 PREV VISIT EST AGE 18-39: CPT | Performed by: NURSE PRACTITIONER

## 2024-09-05 PROCEDURE — 87591 N.GONORRHOEAE DNA AMP PROB: CPT | Performed by: NURSE PRACTITIONER

## 2024-09-05 PROCEDURE — 86780 TREPONEMA PALLIDUM: CPT | Performed by: NURSE PRACTITIONER

## 2024-09-05 PROCEDURE — 87389 HIV-1 AG W/HIV-1&-2 AB AG IA: CPT | Performed by: NURSE PRACTITIONER

## 2024-09-05 PROCEDURE — 86803 HEPATITIS C AB TEST: CPT | Performed by: NURSE PRACTITIONER

## 2024-09-05 NOTE — PROGRESS NOTES
Farhan is a 29 year old  female who presents for annual exam.     Besides routine health maintenance,  she would like to discuss US result.    HPI:  The patient's PCP is Dr. Christian Bennett MD.     Patient here today for her annual GYN exam and to review her ultrasound.  She has known PCOS but has not had any pelvic imaging.  She is on Wegovy and working with primary care to keep this up.  She has a ParaGard IUD in place and has regular menstrual cycles.  She is sexually active and accepts full STD testing today.  She is on the search for a suitable therapist.  She is due for a Pap smear today.    She has a question about a possible skin tag near her anus.  She feels it is bigger.  It was tender at the beginning but is not painful and no bleeding.      GYNECOLOGIC HISTORY:    Patient's last menstrual period was 2024 (exact date).    Regular menses? yes  Menses every 28 days.  Length of menses: 3 days    Her current contraception method is: IUD.  She  reports that she has never smoked. She has never used smokeless tobacco.    Patient is sexually active.  STD testing offered?  Accepted  Last PHQ-9 score on record =       2024     2:12 PM   PHQ-9 SCORE   PHQ-9 Total Score MyChart 12 (Moderate depression)   PHQ-9 Total Score 12     Last GAD7 score on record =       2024     2:13 PM   ISAAC-7 SCORE   Total Score 10 (moderate anxiety)   Total Score 10     Alcohol Score =     HEALTH MAINTENANCE:    Cholesterol:  2023 164    Last Mammo: Not applicable, Result: Not applicable, Next Mammo: Due at age 41     Pap: ?  Colonoscopy:  NA, Result: Not applicable, Next Colonoscopy: 46 years.  Dexa:  NA    Health maintenance updated:  yes    HISTORY:  OB History    Para Term  AB Living   0 0 0 0 0 0   SAB IAB Ectopic Multiple Live Births   0 0 0 0 0       Patient Active Problem List   Diagnosis    NO ACTIVE PROBLEMS    CARDIOVASCULAR SCREENING; LDL GOAL LESS THAN 160    Anti-TPO antibodies  "present    PCOS (polycystic ovarian syndrome)    Class 2 obesity due to excess calories without serious comorbidity with body mass index (BMI) of 35.0 to 35.9 in adult    Anxiety    Chronic lymphocytic thyroiditis     History reviewed. No pertinent surgical history.   Social History     Tobacco Use    Smoking status: Never    Smokeless tobacco: Never   Substance Use Topics    Alcohol use: Yes      Problem (# of Occurrences) Relation (Name,Age of Onset)    Breast Cancer (2) Maternal Grandmother, Maternal Aunt    ALS (1) Maternal Grandmother    Hashimoto's thyroiditis (1) Father           Negative family history of: Melanoma, Skin Cancer              Current Outpatient Medications   Medication Sig Dispense Refill    ibuprofen (ADVIL/MOTRIN) 800 MG tablet Take 800 mg by mouth every 8 hours as needed for mild pain or moderate pain      magnesium 250 MG tablet Take 1 tablet by mouth daily      paragard intrauterine copper device 1 each by Intrauterine route once      WEGOVY 2.4 MG/0.75ML pen Inject 2.4 mg subcutaneously once a week 9 mL 1    ALPRAZolam (XANAX) 0.25 MG tablet Take 0.25 mg by mouth as needed (Patient not taking: Reported on 9/5/2024)       No current facility-administered medications for this visit.     Allergies   Allergen Reactions    Shellfish Allergy Other (See Comments)    Nuts Itching     ALMONDS    Other (Do Not Use) Hives     lavander    Penicillins Hives     As a child  Unknown       Past medical, surgical, social and family histories were reviewed and updated in EPIC.    EXAM:  /70   Ht 1.695 m (5' 6.75\")   Wt 96.9 kg (213 lb 9.6 oz)   LMP 08/31/2024 (Exact Date)   Breastfeeding No   BMI 33.71 kg/m     BMI: Body mass index is 33.71 kg/m .    PHYSICAL EXAM:  Constitutional:   Appearance: Well nourished, well developed, alert, in no acute distress  Neck:  Lymph Nodes:  No lymphadenopathy present    Thyroid:  Gland size normal, nontender, no nodules or masses present  on " palpation  Chest:  Respiratory Effort:  Breathing unlabored  Cardiovascular:    Heart: Auscultation:  Regular rate, normal rhythm, no murmurs present  Breasts: Inspection of Breasts:  No lymphadenopathy present., Palpation of Breasts and Axillae:  No masses present on palpation, no breast tenderness., Axillary Lymph Nodes:  No lymphadenopathy present., and No nodularity, asymmetry or nipple discharge bilaterally.  Gastrointestinal:   Abdominal Examination:  Abdomen nontender to palpation, tone normal without rigidity or guarding, no masses present, umbilicus without lesions   Liver and Spleen:  No hepatomegaly present, liver nontender to palpation    Hernias:  No hernias present  Lymphatic: Lymph Nodes:  No other lymphadenopathy present  Skin:  General Inspection:  No rashes present, no lesions present, no areas of  discoloration  Neurologic:    Mental Status:  Oriented X3.  Normal strength and tone, sensory exam                grossly normal, mentation intact and speech normal.    Psychiatric:   Mentation appears normal and affect normal/bright.         Pelvic Exam:  External Genitalia:     Normal appearance for age, no discharge present, no tenderness present, no inflammatory lesions present, color normal  Vagina:    Normal vaginal vault without central or paravaginal defects, no discharge present, no inflammatory lesions present, no masses present  Bladder:     Nontender to palpation  Urethra:   Urethral Body:  Urethra palpation normal, urethra structural support normal   Urethral Meatus:  No erythema or lesions present  Cervix:     Appearance healthy, no lesions present, nontender to palpation, no bleeding present, string present  Uterus:     Nontender to palpation, no masses present, position anteflexed, mobility: normal  Adnexa:     No adnexal tenderness present, no adnexal masses present  Perineum:     Perineum within normal limits, no evidence of trauma, no rashes or skin lesions present  Anus:     Anus  within normal limits, no hemorrhoids present- fleshy colored, raised, semi-solid nodule measuring 3mm diameter to the left of the anus  Inguinal Lymph Nodes:     No lymphadenopathy present  Pubic Hair:     Normal pubic hair distribution for age  Genitalia and Groin:     No rashes present, no lesions present, no areas of discoloration, no masses present    COUNSELING:   Special attention given to:        Regular exercise       Healthy diet/nutrition       Contraception    BMI: Body mass index is 33.71 kg/m .  Weight management plan: Discussed healthy diet and exercise guidelines    ASSESSMENT:  29 year old female with satisfactory annual exam.    ICD-10-CM    1. Encounter for gynecological examination without abnormal finding  Z01.419 Pap Screen Reflex to HPV if ASCUS - Recommended Age 25 - 29 Years     MA PELVIC EXAMINATION      2. Screening for chlamydial disease  Z11.8 CHLAMYDIA TRACHOMATIS PCR      3. Screening examination for STD (sexually transmitted disease)  Z11.3 NEISSERIA GONORRHOEA PCR     Treponema Abs w Reflex to RPR and Titer     Treponema Abs w Reflex to RPR and Titer      4. Screening for HIV (human immunodeficiency virus)  Z11.4 HIV Antigen Antibody Combo Cascade     HIV Antigen Antibody Combo Cascade      5. Encounter for hepatitis C screening test for low risk patient  Z11.59 Hepatitis C Screen Reflex to HCV RNA Quant and Genotype     Hepatitis C Screen Reflex to HCV RNA Quant and Genotype      6. Immunity status testing  Z01.84 Hepatitis B Surface Antibody     Hepatitis B Surface Antibody      7. Perianal lesion  K62.9           PLAN:  29-year-old female with a normal exam.  For the perianal lesion we have referred her to see one of the PAs for possible removal. ? HPV lesion?.  Pap smear was collected and if it is normal she can repeat in 3 years.  STD testing was completed.  We encouraged her on her quest for a suitable therapist.    Ultrasound was reviewed.  PCOS as suspected.  Her IUD is in  place.  Normal EMS.    NAYE Damon CNP

## 2024-09-05 NOTE — PROGRESS NOTES
SUBJECTIVE:                                                   Farhan Fowler is a 29 year old female who presents to clinic today for the following health issue(s):  Patient presents with:  Follow Up: US      Additional information: ***    HPI:  ***    Patient's last menstrual period was 2024 (exact date)..     Patient is sexually active, .  Using IUD for contraception.    reports that she has never smoked. She has never used smokeless tobacco.    STD testing offered?  Accepted    Health maintenance updated:  yes    Today's PHQ-2 Score:       2024     2:13 PM   PHQ-2 (  [employer])   Q1: Little interest or pleasure in doing things 2   Q2: Feeling down, depressed or hopeless 1   PHQ-2 Score 3   Q1: Little interest or pleasure in doing things More than half the days   Q2: Feeling down, depressed or hopeless Several days   PHQ-2 Score 3     Today's PHQ-9 Score:       2024     2:12 PM   PHQ-9 SCORE   PHQ-9 Total Score MyChart 12 (Moderate depression)   PHQ-9 Total Score 12     Today's ISAAC-7 Score:       2024     2:13 PM   ISAAC-7 SCORE   Total Score 10 (moderate anxiety)   Total Score 10       Problem list and histories reviewed & adjusted, as indicated.  Additional history: as documented.    Patient Active Problem List   Diagnosis    NO ACTIVE PROBLEMS    CARDIOVASCULAR SCREENING; LDL GOAL LESS THAN 160    Anti-TPO antibodies present    PCOS (polycystic ovarian syndrome)    Class 2 obesity due to excess calories without serious comorbidity with body mass index (BMI) of 35.0 to 35.9 in adult    Anxiety    Chronic lymphocytic thyroiditis     No past surgical history on file.   Social History     Tobacco Use    Smoking status: Never    Smokeless tobacco: Never   Substance Use Topics    Alcohol use: Yes      Problem (# of Occurrences) Relation (Name,Age of Onset)    Breast Cancer (2) Maternal Grandmother, Maternal Aunt    ALS (1) Maternal Grandmother    Hashimoto's thyroiditis (1) Father       "     Negative family history of: Melanoma, Skin Cancer              Current Outpatient Medications   Medication Sig Dispense Refill    ibuprofen (ADVIL/MOTRIN) 800 MG tablet Take 800 mg by mouth every 8 hours as needed for mild pain or moderate pain      magnesium 250 MG tablet Take 1 tablet by mouth daily      paragard intrauterine copper device 1 each by Intrauterine route once      WEGOVY 2.4 MG/0.75ML pen Inject 2.4 mg subcutaneously once a week 9 mL 1    ALPRAZolam (XANAX) 0.25 MG tablet Take 0.25 mg by mouth as needed (Patient not taking: Reported on 9/5/2024)       No current facility-administered medications for this visit.     Allergies   Allergen Reactions    Shellfish Allergy Other (See Comments)    Nuts Itching     ALMONDS    Other (Do Not Use) Hives     lavander    Penicillins Hives     As a child  Unknown       ROS:  {[facility] ROSGYN:697736::\"12 point review of systems negative other than symptoms noted below or in the HPI.\"}  {ROS - :427607}      OBJECTIVE:     Ht 1.695 m (5' 6.75\")   Wt 96.9 kg (213 lb 9.6 oz)   LMP 08/31/2024 (Exact Date)   Breastfeeding No   BMI 33.71 kg/m    Body mass index is 33.71 kg/m .    Exam:  {[facility] EXAM CHOICES:627335}     In-Clinic Test Results:  No results found for this or any previous visit (from the past 24 hour(s)).    ASSESSMENT/PLAN:                                                      No diagnosis found.    There are no Patient Instructions on file for this visit.    ***    Bernie Dunn, NAYE CNP  [facility]    "

## 2024-09-06 LAB
C TRACH DNA SPEC QL NAA+PROBE: NEGATIVE
HBV SURFACE AB SERPL IA-ACNC: 9.69 M[IU]/ML
HBV SURFACE AB SERPL IA-ACNC: NORMAL M[IU]/ML
HCV AB SERPL QL IA: NONREACTIVE
HIV 1+2 AB+HIV1 P24 AG SERPL QL IA: NONREACTIVE
N GONORRHOEA DNA SPEC QL NAA+PROBE: NEGATIVE

## 2024-09-09 ENCOUNTER — OFFICE VISIT (OUTPATIENT)
Dept: OBGYN | Facility: CLINIC | Age: 29
End: 2024-09-09
Payer: COMMERCIAL

## 2024-09-09 VITALS
DIASTOLIC BLOOD PRESSURE: 68 MMHG | BODY MASS INDEX: 33.27 KG/M2 | SYSTOLIC BLOOD PRESSURE: 120 MMHG | WEIGHT: 212 LBS | HEIGHT: 67 IN

## 2024-09-09 DIAGNOSIS — B37.2 YEAST DERMATITIS: ICD-10-CM

## 2024-09-09 DIAGNOSIS — K62.9 PERIANAL LESION: Primary | ICD-10-CM

## 2024-09-09 DIAGNOSIS — N94.10 DYSPAREUNIA IN FEMALE: ICD-10-CM

## 2024-09-09 PROCEDURE — 99459 PELVIC EXAMINATION: CPT

## 2024-09-09 PROCEDURE — 99213 OFFICE O/P EST LOW 20 MIN: CPT | Mod: 25

## 2024-09-09 PROCEDURE — 88312 SPECIAL STAINS GROUP 1: CPT | Performed by: PATHOLOGY

## 2024-09-09 PROCEDURE — 11300 SHAVE SKIN LESION 0.5 CM/<: CPT

## 2024-09-09 PROCEDURE — 88305 TISSUE EXAM BY PATHOLOGIST: CPT | Performed by: PATHOLOGY

## 2024-09-09 RX ORDER — LIDOCAINE HYDROCHLORIDE AND EPINEPHRINE 10; 10 MG/ML; UG/ML
1 INJECTION, SOLUTION INFILTRATION; PERINEURAL ONCE
Status: COMPLETED | OUTPATIENT
Start: 2024-09-09 | End: 2024-09-09

## 2024-09-09 RX ADMIN — LIDOCAINE HYDROCHLORIDE AND EPINEPHRINE 1 ML: 10; 10 INJECTION, SOLUTION INFILTRATION; PERINEURAL at 13:55

## 2024-09-09 NOTE — PROGRESS NOTES
SUBJECTIVE:                                                   Farhan Fowler is a 29 year old female who presents to clinic today for the following health issue(s):  Patient presents with:  Vaginal Problem: Lump on the perineum        HPI:  Patient saw Bernie Dunn 24 for annual exam, perianal lesion found. Here for biopsy of lesion.    During procedure patient discusses history of trauma causing dyspareunia and overall vulvar sensitivity.     Patient's last menstrual period was 2024 (exact date)..     Patient is sexually active, .  Using IUD for contraception. Paragard 2019   reports that she has never smoked. She has never used smokeless tobacco.    STD testing offered?  Declined    Health maintenance updated:  yes    Overdue          Never done ADVANCE CARE PLANNING (Every 5 Years)     Never done HEPATITIS B IMMUNIZATION (1 of 3 - 19+ 3-dose series)     Never done PAP (Every 3 Years)   Last ordered: Sep 5, 2024     OCT 9  2019 DTAP/TDAP/TD IMMUNIZATION (2 - Td or Tdap)  Last completed: Oct 9, 2009     Never done INFLUENZA VACCINE (1)     SEP 1  2024 COVID-19 Vaccine ( season)  Last completed: 2022       Today's PHQ-2 Score:       2024     2:13 PM   PHQ-2 (  [employer])   Q1: Little interest or pleasure in doing things 2   Q2: Feeling down, depressed or hopeless 1   PHQ-2 Score 3   Q1: Little interest or pleasure in doing things More than half the days   Q2: Feeling down, depressed or hopeless Several days   PHQ-2 Score 3     Today's PHQ-9 Score:       2024     2:12 PM   PHQ-9 SCORE   PHQ-9 Total Score MyChart 12 (Moderate depression)   PHQ-9 Total Score 12     Today's ISAAC-7 Score:       2024     2:13 PM   ISAAC-7 SCORE   Total Score 10 (moderate anxiety)   Total Score 10       Problem list and histories reviewed & adjusted, as indicated.  Additional history: as documented.    Patient Active Problem List   Diagnosis    NO ACTIVE PROBLEMS    CARDIOVASCULAR  "SCREENING; LDL GOAL LESS THAN 160    Anti-TPO antibodies present    PCOS (polycystic ovarian syndrome)    Class 2 obesity due to excess calories without serious comorbidity with body mass index (BMI) of 35.0 to 35.9 in adult    Anxiety    Chronic lymphocytic thyroiditis     History reviewed. No pertinent surgical history.   Social History     Tobacco Use    Smoking status: Never    Smokeless tobacco: Never   Substance Use Topics    Alcohol use: Yes      Problem (# of Occurrences) Relation (Name,Age of Onset)    Breast Cancer (2) Maternal Grandmother, Maternal Aunt    ALS (1) Maternal Grandmother    Hashimoto's thyroiditis (1) Father           Negative family history of: Melanoma, Skin Cancer              Current Outpatient Medications   Medication Sig Dispense Refill    ALPRAZolam (XANAX) 0.25 MG tablet Take 0.25 mg by mouth as needed.      ibuprofen (ADVIL/MOTRIN) 800 MG tablet Take 800 mg by mouth every 8 hours as needed for mild pain or moderate pain      magnesium 250 MG tablet Take 1 tablet by mouth daily      paragard intrauterine copper device 1 each by Intrauterine route once      WEGOVY 2.4 MG/0.75ML pen Inject 2.4 mg subcutaneously once a week 9 mL 1     No current facility-administered medications for this visit.     Allergies   Allergen Reactions    Shellfish Allergy Other (See Comments)    Nuts Itching     ALMONDS    Other (Do Not Use) Hives     lavander    Penicillins Hives     As a child  Unknown         OBJECTIVE:     /68 (BP Location: Right arm)   Ht 1.695 m (5' 6.75\")   Wt 96.2 kg (212 lb)   LMP 08/31/2024 (Exact Date)   BMI 33.45 kg/m    Body mass index is 33.45 kg/m .    Exam:  Psychiatric:   Mentation appears normal and affect normal/bright.                                                                Pelvic Exam:  External Genitalia:                Normal appearance for age, no discharge present, no tenderness present, no inflammatory lesions present, color normal  Vagina:           "     Normal vaginal vault without central or paravaginal defects, no discharge present, no inflammatory lesions present, no masses present  Bladder:                Nontender to palpation  Urethra:              Urethral Body:  Urethra palpation normal, urethra structural support normal              Urethral Meatus:  No erythema or lesions present  Cervix:                Appearance healthy, no lesions present, nontender to palpation, no bleeding present, string present  Uterus:                Nontender to palpation, no masses present, position anteflexed, mobility: normal  Adnexa:                No adnexal tenderness present, no adnexal masses present  Perineum:                Perineum within normal limits, no evidence of trauma, no rashes or skin lesions present  Anus:                Anus within normal limits, no hemorrhoids present- fleshy colored, raised, semi-solid nodule measuring 3mm diameter to the left of the anus  Inguinal Lymph Nodes:                No lymphadenopathy present  Pubic Hair:                Normal pubic hair distribution for age  Genitalia and Groin:                No rashes present, no lesions present, no areas of discoloration, no masses present         In-Clinic Test Results:  No results found for this or any previous visit (from the past 24 hour(s)).    Procedure:  Lesion was prepped with betadine x 3. Injected with 1cc 1%lidocaine with epi. Ensured patient was numb from lidocaine before proceeding with procedure. Lesion was grasped and raised with pick ups. Using a scissors lesion was excised. Hemostasis was achieved with silver nitrate x 2. Bacitracin applied to area with clean 4x4 gauze.   Patient tolerated procedure well.      ASSESSMENT/PLAN:                                                        ICD-10-CM    1. Perianal lesion  K62.9 lidocaine 1% with EPINEPHrine 1:100,000 injection 1 mL     Surgical Pathology Exam      2. Dyspareunia in female  N94.10 Physical Therapy   Referral        -lesion removed today  -discussed sitz bathes/epsom salt bathes  - reviewed vulvar hygiene       PIETRO [facility] [place]    Addendum:  Biopsy shows some fungal organisms. Sent in fluconazole.  Biopsy benign

## 2024-09-10 LAB
BKR LAB AP GYN ADEQUACY: NORMAL
BKR LAB AP GYN INTERPRETATION: NORMAL
BKR LAB AP HPV REFLEX: NORMAL
BKR LAB AP LMP: NORMAL
BKR LAB AP PREVIOUS ABNORMAL: NORMAL
PATH REPORT.COMMENTS IMP SPEC: NORMAL
PATH REPORT.COMMENTS IMP SPEC: NORMAL
PATH REPORT.RELEVANT HX SPEC: NORMAL

## 2024-09-12 LAB
PATH REPORT.COMMENTS IMP SPEC: NORMAL
PATH REPORT.FINAL DX SPEC: NORMAL
PATH REPORT.GROSS SPEC: NORMAL
PATH REPORT.MICROSCOPIC SPEC OTHER STN: NORMAL
PATH REPORT.RELEVANT HX SPEC: NORMAL
PHOTO IMAGE: NORMAL

## 2024-09-12 RX ORDER — FLUCONAZOLE 150 MG/1
150 TABLET ORAL
Qty: 3 TABLET | Refills: 0 | Status: SHIPPED | OUTPATIENT
Start: 2024-09-12 | End: 2024-09-19

## 2024-10-08 ENCOUNTER — MYC MEDICAL ADVICE (OUTPATIENT)
Dept: FAMILY MEDICINE | Facility: CLINIC | Age: 29
End: 2024-10-08

## 2024-10-08 ENCOUNTER — MYC REFILL (OUTPATIENT)
Dept: FAMILY MEDICINE | Facility: CLINIC | Age: 29
End: 2024-10-08

## 2024-10-08 DIAGNOSIS — E66.09 CLASS 2 OBESITY DUE TO EXCESS CALORIES WITHOUT SERIOUS COMORBIDITY WITH BODY MASS INDEX (BMI) OF 35.0 TO 35.9 IN ADULT: ICD-10-CM

## 2024-10-08 DIAGNOSIS — E28.2 PCOS (POLYCYSTIC OVARIAN SYNDROME): ICD-10-CM

## 2024-10-08 DIAGNOSIS — E66.812 CLASS 2 OBESITY DUE TO EXCESS CALORIES WITHOUT SERIOUS COMORBIDITY WITH BODY MASS INDEX (BMI) OF 35.0 TO 35.9 IN ADULT: ICD-10-CM

## 2024-10-08 RX ORDER — SEMAGLUTIDE 2.4 MG/.75ML
2.4 INJECTION, SOLUTION SUBCUTANEOUS WEEKLY
Qty: 9 ML | Refills: 1 | Status: CANCELLED | OUTPATIENT
Start: 2024-10-08

## 2025-01-19 ENCOUNTER — MYC MEDICAL ADVICE (OUTPATIENT)
Dept: FAMILY MEDICINE | Facility: CLINIC | Age: 30
End: 2025-01-19

## 2025-05-10 DIAGNOSIS — E28.2 PCOS (POLYCYSTIC OVARIAN SYNDROME): Primary | ICD-10-CM

## 2025-05-10 DIAGNOSIS — E66.09 CLASS 2 OBESITY DUE TO EXCESS CALORIES WITHOUT SERIOUS COMORBIDITY WITH BODY MASS INDEX (BMI) OF 35.0 TO 35.9 IN ADULT: ICD-10-CM

## 2025-05-10 DIAGNOSIS — E66.812 CLASS 2 OBESITY DUE TO EXCESS CALORIES WITHOUT SERIOUS COMORBIDITY WITH BODY MASS INDEX (BMI) OF 35.0 TO 35.9 IN ADULT: ICD-10-CM

## 2025-05-12 RX ORDER — SEMAGLUTIDE 2.4 MG/.75ML
INJECTION, SOLUTION SUBCUTANEOUS
Qty: 9 ML | Refills: 0 | Status: SHIPPED | OUTPATIENT
Start: 2025-05-12

## 2025-05-12 NOTE — TELEPHONE ENCOUNTER
Med: Jolynn GILES (related): 8/14/24      Due for F/U around: 9/2025    Next Appt: None scheduled        Wt Readings from Last 2 Encounters:   09/09/24 96.2 kg (212 lb)   09/05/24 96.9 kg (213 lb 9.6 oz)       BMI Readings from Last 2 Encounters:   09/09/24 33.45 kg/m    09/05/24 33.71 kg/m

## 2025-06-27 ENCOUNTER — OFFICE VISIT (OUTPATIENT)
Dept: FAMILY MEDICINE | Facility: CLINIC | Age: 30
End: 2025-06-27

## 2025-06-27 VITALS
HEART RATE: 74 BPM | RESPIRATION RATE: 16 BRPM | DIASTOLIC BLOOD PRESSURE: 65 MMHG | WEIGHT: 186.2 LBS | SYSTOLIC BLOOD PRESSURE: 104 MMHG | BODY MASS INDEX: 29.22 KG/M2 | OXYGEN SATURATION: 97 % | HEIGHT: 67 IN

## 2025-06-27 DIAGNOSIS — N63.22 MASS OF UPPER INNER QUADRANT OF LEFT BREAST: Primary | ICD-10-CM

## 2025-06-27 PROCEDURE — G2211 COMPLEX E/M VISIT ADD ON: HCPCS | Performed by: FAMILY MEDICINE

## 2025-06-27 PROCEDURE — 99214 OFFICE O/P EST MOD 30 MIN: CPT | Performed by: FAMILY MEDICINE

## 2025-06-27 PROCEDURE — 3078F DIAST BP <80 MM HG: CPT | Performed by: FAMILY MEDICINE

## 2025-06-27 PROCEDURE — 3074F SYST BP LT 130 MM HG: CPT | Performed by: FAMILY MEDICINE

## 2025-06-27 ASSESSMENT — PATIENT HEALTH QUESTIONNAIRE - PHQ9
SUM OF ALL RESPONSES TO PHQ QUESTIONS 1-9: 14
10. IF YOU CHECKED OFF ANY PROBLEMS, HOW DIFFICULT HAVE THESE PROBLEMS MADE IT FOR YOU TO DO YOUR WORK, TAKE CARE OF THINGS AT HOME, OR GET ALONG WITH OTHER PEOPLE: SOMEWHAT DIFFICULT
SUM OF ALL RESPONSES TO PHQ QUESTIONS 1-9: 14

## 2025-06-28 NOTE — PROGRESS NOTES
"  Pavel Real is a 30 year old patient who presents to clinic for evaluation of left breast concern.  Noticed a lump there and unsure if it is her bone or a mass.  Not tender.  No redness or swelling.  Has family hx of breast cancer as documented in MGM and aunt.  No other concerns.        Review of Systems   Constitutional, HEENT, cardiovascular, pulmonary, GI, , musculoskeletal, neuro, skin, endocrine and psych systems are negative, except as otherwise noted.      Objective    /65   Pulse 74   Resp 16   Ht 1.702 m (5' 7\")   Wt 84.5 kg (186 lb 3.2 oz)   SpO2 97%   BMI 29.16 kg/m      General: Well appearing, NAD  Breasts: chaperoned exam with EB.  Appear normal and symmetric without erythema.  The area of concern on left is consistent with a rib.  However, inferior to this is a small firm slightly mobile mass at approximately 9:00.  Nontender.  No nipple discharge or erythema  Psych: normal mood and affect      Mass of upper inner quadrant of left breast  Likely benign but given new growth will obtain imaging  - [facility] Left w/ Mike; Future    Follow up prn          Answers submitted by the patient for this visit:  Patient Health Questionnaire (Submitted on 6/27/2025)  If you checked off any problems, how difficult have these problems made it for you to do your work, take care of things at home, or get along with other people?: Somewhat difficult  PHQ9 TOTAL SCORE: 14  General Questionnaire (Submitted on 6/27/2025)  Chief Complaint: Chronic problems general questions HPI Form  What is the reason for your visit today? : pcos check in  How many servings of fruits and vegetables do you eat daily?: 2-3  On average, how many sweetened beverages do you drink each day (Examples: soda, juice, sweet tea, etc.  Do NOT count diet or artificially sweetened beverages)?: 1  How many minutes a day do you exercise enough to make your heart beat faster?: 30 to 60  How many days a week do you exercise " enough to make your heart beat faster?: 3 or less  How many days per week do you miss taking your medication?: 0  Questionnaire about: Chronic problems general questions HPI Form (Submitted on 6/27/2025)  Chief Complaint: Chronic problems general questions HPI Form

## 2025-07-09 ENCOUNTER — MYC MEDICAL ADVICE (OUTPATIENT)
Dept: FAMILY MEDICINE | Facility: CLINIC | Age: 30
End: 2025-07-09

## 2025-07-09 DIAGNOSIS — N63.22 MASS OF UPPER INNER QUADRANT OF LEFT BREAST: Primary | ICD-10-CM

## 2025-07-09 DIAGNOSIS — Z80.3 FAMILY HISTORY OF MALIGNANT NEOPLASM OF BREAST: ICD-10-CM

## 2025-07-09 NOTE — TELEPHONE ENCOUNTER
See [facility] [employer] message.    Bilateral diagnostic mammogram ordered to replace the left diagnostic mammogram. Kept left breast ultrasound order in place.   Called [facility] radiology scheduling and they have changed the 7/14 appt to bilateral instead of only left breast.   Patient informed via [employer] and given info/codes to contact insurance to check coverage if she wants.   Ruth Chacon RN

## 2025-07-14 ENCOUNTER — ANCILLARY PROCEDURE (OUTPATIENT)
Dept: ULTRASOUND IMAGING | Facility: CLINIC | Age: 30
End: 2025-07-14
Attending: FAMILY MEDICINE
Payer: COMMERCIAL

## 2025-07-14 DIAGNOSIS — N63.22 MASS OF UPPER INNER QUADRANT OF LEFT BREAST: ICD-10-CM

## 2025-07-14 PROCEDURE — 76642 ULTRASOUND BREAST LIMITED: CPT | Mod: LT

## 2025-08-20 DIAGNOSIS — E66.812 CLASS 2 OBESITY DUE TO EXCESS CALORIES WITHOUT SERIOUS COMORBIDITY WITH BODY MASS INDEX (BMI) OF 35.0 TO 35.9 IN ADULT: ICD-10-CM

## 2025-08-20 DIAGNOSIS — E66.09 CLASS 2 OBESITY DUE TO EXCESS CALORIES WITHOUT SERIOUS COMORBIDITY WITH BODY MASS INDEX (BMI) OF 35.0 TO 35.9 IN ADULT: ICD-10-CM

## 2025-08-20 DIAGNOSIS — E28.2 PCOS (POLYCYSTIC OVARIAN SYNDROME): ICD-10-CM

## 2025-08-21 RX ORDER — SEMAGLUTIDE 2.4 MG/.75ML
INJECTION, SOLUTION SUBCUTANEOUS
Qty: 9 ML | Refills: 0 | Status: SHIPPED | OUTPATIENT
Start: 2025-08-21